# Patient Record
Sex: MALE | URBAN - METROPOLITAN AREA
[De-identification: names, ages, dates, MRNs, and addresses within clinical notes are randomized per-mention and may not be internally consistent; named-entity substitution may affect disease eponyms.]

---

## 2024-08-01 DIAGNOSIS — Z00.5 WILLING TO BE AN ORGAN DONOR: Primary | ICD-10-CM

## 2024-08-07 ENCOUNTER — LAB VISIT (OUTPATIENT)
Dept: LAB | Facility: HOSPITAL | Age: 74
End: 2024-08-07
Payer: MEDICARE

## 2024-08-07 DIAGNOSIS — Z00.5 WILLING TO BE AN ORGAN DONOR: ICD-10-CM

## 2024-08-07 PROCEDURE — 36415 COLL VENOUS BLD VENIPUNCTURE: CPT | Mod: TXP | Performed by: NURSE PRACTITIONER

## 2024-08-07 PROCEDURE — 86901 BLOOD TYPING SEROLOGIC RH(D): CPT | Mod: TXP | Performed by: NURSE PRACTITIONER

## 2024-08-07 PROCEDURE — 86900 BLOOD TYPING SEROLOGIC ABO: CPT | Mod: TXP | Performed by: NURSE PRACTITIONER

## 2024-08-08 LAB — ABO + RH BLD: NORMAL

## 2024-08-09 ENCOUNTER — COMMITTEE REVIEW (OUTPATIENT)
Dept: TRANSPLANT | Facility: CLINIC | Age: 74
End: 2024-08-09
Payer: MEDICARE

## 2024-08-09 NOTE — COMMITTEE REVIEW
Cedric Gonzalez was presented at selection committee on 8/9/24. He is a paired kidney donor from [facility] being offered to our recipient via [employer]. Patient did meet selection criteria for living kidney donation. No absolute contraindications noted.     CT scan reviewed and acceptable.     Note written by Jasmyne Bermudez.    ================================================================  I was present at the meeting and attest to the decision of the committee.    Agueda Kenny  Transplant Nephrology